# Patient Record
Sex: MALE | NOT HISPANIC OR LATINO | URBAN - METROPOLITAN AREA
[De-identification: names, ages, dates, MRNs, and addresses within clinical notes are randomized per-mention and may not be internally consistent; named-entity substitution may affect disease eponyms.]

---

## 2017-01-03 ENCOUNTER — IMPORTED ENCOUNTER (OUTPATIENT)
Dept: URBAN - METROPOLITAN AREA CLINIC 1 | Facility: CLINIC | Age: 77
End: 2017-01-03

## 2017-01-03 PROBLEM — Z96.1: Noted: 2017-01-03

## 2017-01-03 PROCEDURE — 99024 POSTOP FOLLOW-UP VISIT: CPT

## 2017-01-03 NOTE — PATIENT DISCUSSION
POM#1 CE/IOL (standard) OS doing well. Continue Lotemax/Durezol/Prednisolone BID until gone. Continue Prolensa/Ilevro/Acular QD until gone. Return for an appointment for a 30 in October with Dr. Romy Singh.

## 2017-04-20 NOTE — PATIENT DISCUSSION
Amblyopia OS: ALL LOOKS GOOD AT THIS POINT VERY MINIMAL. GAVE RX FOR NEW GLASSES.  WILL FOLLOW YEARLY

## 2017-04-20 NOTE — PATIENT DISCUSSION
DRY EYES : Discussed with patient the importance of keeping the eye moist and the symptoms associated with dry eyes including blurry vision, tearing, burning, and radha sensation. Advised patient to minimize use of any fans blowing directly on the face. Advised patient to continue with artificial tears 2-3 times daily.

## 2017-06-07 ENCOUNTER — IMPORTED ENCOUNTER (OUTPATIENT)
Dept: URBAN - METROPOLITAN AREA CLINIC 1 | Facility: CLINIC | Age: 77
End: 2017-06-07

## 2017-06-07 PROBLEM — Z96.1: Noted: 2017-06-07

## 2017-06-07 PROBLEM — Z79.84: Noted: 2017-06-07

## 2017-06-07 PROBLEM — H43.811: Noted: 2017-06-07

## 2017-06-07 PROBLEM — E11.9: Noted: 2017-06-07

## 2017-06-07 PROCEDURE — 92012 INTRM OPH EXAM EST PATIENT: CPT

## 2017-06-07 NOTE — PATIENT DISCUSSION
1.  DM Type II (Oral Medication) without sign of diabetic retinopathy and no blot heme on dilated retinal examination today OU No Macular Edema: Patient reports not having checked BS lately. VA stable since previous visit. Discussed the pathophysiology of diabetes and its effect on the eye and risk of blindness. Stressed the importance of strong glucose control. Advised of importance of at least yearly dilated examinations but to contact us immediately for any problems or concerns. 2. Pseudophakia OU - (Restor OD Standard OS)  3. PVD w/o Tear OD - RD precautions. 4.  H/o Previous Retinal Tear OD H/o Laser Retinopexy OD by Dr. Radha Cervantes. Return for an appointment for Return as scheduled with Dr. Seth Jackson.

## 2019-08-13 ENCOUNTER — IMPORTED ENCOUNTER (OUTPATIENT)
Dept: URBAN - METROPOLITAN AREA CLINIC 1 | Facility: CLINIC | Age: 79
End: 2019-08-13

## 2019-08-13 PROBLEM — H26.492: Noted: 2019-08-13

## 2019-08-13 PROBLEM — E11.9: Noted: 2019-08-13

## 2019-08-13 PROBLEM — Z79.84: Noted: 2019-08-13

## 2019-08-13 PROBLEM — H43.811: Noted: 2019-08-13

## 2019-08-13 PROBLEM — Z96.1: Noted: 2019-08-13

## 2019-08-13 PROCEDURE — 92015 DETERMINE REFRACTIVE STATE: CPT

## 2019-08-13 PROCEDURE — 92014 COMPRE OPH EXAM EST PT 1/>: CPT

## 2019-08-13 NOTE — PATIENT DISCUSSION
PCO OS- Visually Significant secondary to glare; Schedule YAG Cap OS only. Pros cons risks and benefits.

## 2019-08-13 NOTE — PATIENT DISCUSSION
1.  DM Type II (Oral Meds) without sign of diabetic retinopathy and no blot heme on dilated retinal examination today OU No Macular Edema:  Discussed the pathophysiology of diabetes and its effect on the eye and risk of blindness. Stressed the importance of strong glucose control. Advised of importance of at least yearly dilated examinations but to contact us immediately for any problems or concerns. 2. PCO OS- Visually Significant secondary to glare; Schedule YAG Cap OS only. Pros cons risks and benefits. 3.  PVD w/o Tear OD - RD precautions. 4.  Pseudophakia OU - (Restor OD *Not PMG OD* Standard OS *PMG*)  5. H/o Previous Retinal Tear OD H/o Laser Retinopexy OD by Dr. Abbey Hawthorne. Ok to use 1.25 OTC readers for near/reading work Letter to PCP Schedule YAG Cap OS

## 2019-09-20 ENCOUNTER — IMPORTED ENCOUNTER (OUTPATIENT)
Dept: URBAN - METROPOLITAN AREA CLINIC 1 | Facility: CLINIC | Age: 79
End: 2019-09-20

## 2019-09-20 PROBLEM — H26.492: Noted: 2019-09-20

## 2019-09-20 PROCEDURE — 66821 AFTER CATARACT LASER SURGERY: CPT

## 2019-09-20 NOTE — PATIENT DISCUSSION
YAG CAP OS: (Consent signed and scanned into attachments) 1 gtt Prolensa applied. The purpose and nature of the procedure possible alternative methods of treatment the risks involved and the possibility of complications were discussed with patient. The Patient wishes to proceed and the consent was signed. The laser was then performed under topical anesthesia with no complications. Post op instructions were given to patient as well as a follow-up appointment. Patient was advised to call our office if any questions or concerns. Return for an appointment in 1-4 weeks po/yag with Dr. Mani Moon.

## 2019-10-25 ENCOUNTER — IMPORTED ENCOUNTER (OUTPATIENT)
Dept: URBAN - METROPOLITAN AREA CLINIC 1 | Facility: CLINIC | Age: 79
End: 2019-10-25

## 2019-10-25 PROBLEM — Z09: Noted: 2019-10-25

## 2019-10-25 PROCEDURE — 99024 POSTOP FOLLOW-UP VISIT: CPT

## 2019-10-25 NOTE — PATIENT DISCUSSION
PO YAG Cap OS: good result. MRX given. Return for an appointment in August 30 with Dr. Dashawn Arvizu.

## 2021-06-10 ENCOUNTER — IMPORTED ENCOUNTER (OUTPATIENT)
Dept: URBAN - METROPOLITAN AREA CLINIC 1 | Facility: CLINIC | Age: 81
End: 2021-06-10

## 2021-06-10 PROBLEM — H16.143: Noted: 2021-06-10

## 2021-06-10 PROBLEM — H04.123: Noted: 2021-06-10

## 2021-06-10 PROBLEM — Z96.1: Noted: 2021-06-10

## 2021-06-10 PROBLEM — Z79.84: Noted: 2021-06-10

## 2021-06-10 PROBLEM — E11.9: Noted: 2021-06-10

## 2021-06-10 PROBLEM — H43.811: Noted: 2021-06-10

## 2021-06-10 PROBLEM — H35.361: Noted: 2021-06-10

## 2021-06-10 PROCEDURE — 99214 OFFICE O/P EST MOD 30 MIN: CPT

## 2021-06-10 PROCEDURE — 92015 DETERMINE REFRACTIVE STATE: CPT

## 2022-04-02 ASSESSMENT — VISUAL ACUITY
OS_CC: 20/40
OS_GLARE: 20/100
OD_CC: 20/30-2
OS_CC: 20/40
OD_GLARE: 20/60
OD_CC: 20/30-2
OS_CC: 20/40-1
OS_CC: 20/30
OD_CC: 20/30-1
OD_CC: 20/40
OS_CC: 20/30-2
OS_GLARE: 20/40

## 2022-04-02 ASSESSMENT — TONOMETRY
OD_IOP_MMHG: 11
OS_IOP_MMHG: 13
OD_IOP_MMHG: 12
OS_IOP_MMHG: 14
OS_IOP_MMHG: 11
OS_IOP_MMHG: 11
OD_IOP_MMHG: 12
OS_IOP_MMHG: 13
OD_IOP_MMHG: 12

## 2022-10-17 ENCOUNTER — EMERGENCY VISIT (OUTPATIENT)
Dept: URBAN - METROPOLITAN AREA CLINIC 1 | Facility: CLINIC | Age: 82
End: 2022-10-17

## 2022-10-17 DIAGNOSIS — H43.811: ICD-10-CM

## 2022-10-17 PROCEDURE — 99213 OFFICE O/P EST LOW 20 MIN: CPT

## 2022-10-17 ASSESSMENT — VISUAL ACUITY
OS_SC: 20/40-1
OD_SC: 20/40

## 2022-10-17 ASSESSMENT — TONOMETRY
OS_IOP_MMHG: 13
OD_IOP_MMHG: 13

## 2023-01-23 ENCOUNTER — COMPREHENSIVE EXAM (OUTPATIENT)
Dept: URBAN - METROPOLITAN AREA CLINIC 1 | Facility: CLINIC | Age: 83
End: 2023-01-23

## 2023-01-23 DIAGNOSIS — H04.123: ICD-10-CM

## 2023-01-23 DIAGNOSIS — E11.9: ICD-10-CM

## 2023-01-23 DIAGNOSIS — H43.811: ICD-10-CM

## 2023-01-23 DIAGNOSIS — H47.293: ICD-10-CM

## 2023-01-23 DIAGNOSIS — H35.361: ICD-10-CM

## 2023-01-23 DIAGNOSIS — H16.143: ICD-10-CM

## 2023-01-23 DIAGNOSIS — Z96.1: ICD-10-CM

## 2023-01-23 PROCEDURE — 99214 OFFICE O/P EST MOD 30 MIN: CPT

## 2023-01-23 ASSESSMENT — TONOMETRY
OD_IOP_MMHG: 13
OS_IOP_MMHG: 13

## 2023-01-23 ASSESSMENT — VISUAL ACUITY
OD_SC: 20/50
OS_SC: 20/50

## 2024-03-16 ENCOUNTER — HOSPITAL ENCOUNTER (INPATIENT)
Facility: HOSPITAL | Age: 84
LOS: 1 days | DRG: 871 | End: 2024-03-16
Attending: EMERGENCY MEDICINE | Admitting: INTERNAL MEDICINE
Payer: MEDICARE

## 2024-03-16 ENCOUNTER — APPOINTMENT (OUTPATIENT)
Facility: HOSPITAL | Age: 84
DRG: 871 | End: 2024-03-16
Payer: MEDICARE

## 2024-03-16 ENCOUNTER — HOSPITAL ENCOUNTER (EMERGENCY)
Facility: HOSPITAL | Age: 84
Discharge: HOME | DRG: 871 | End: 2024-03-19
Payer: MEDICARE

## 2024-03-16 VITALS
OXYGEN SATURATION: 75 % | SYSTOLIC BLOOD PRESSURE: 69 MMHG | BODY MASS INDEX: 38.44 KG/M2 | WEIGHT: 268.52 LBS | DIASTOLIC BLOOD PRESSURE: 48 MMHG | HEIGHT: 70 IN | RESPIRATION RATE: 25 BRPM | TEMPERATURE: 90.3 F

## 2024-03-16 DIAGNOSIS — I46.9 PEA (PULSELESS ELECTRICAL ACTIVITY) (HCC): ICD-10-CM

## 2024-03-16 DIAGNOSIS — R57.9 SHOCK (HCC): ICD-10-CM

## 2024-03-16 DIAGNOSIS — I46.9 CARDIAC ARREST (HCC): ICD-10-CM

## 2024-03-16 DIAGNOSIS — I49.01 VENTRICULAR FIBRILLATION (HCC): Primary | ICD-10-CM

## 2024-03-16 DIAGNOSIS — E87.20 LACTIC ACIDOSIS: ICD-10-CM

## 2024-03-16 DIAGNOSIS — N17.9 ACUTE KIDNEY INJURY (HCC): ICD-10-CM

## 2024-03-16 DIAGNOSIS — J96.01 ACUTE RESPIRATORY FAILURE WITH HYPOXIA (HCC): ICD-10-CM

## 2024-03-16 DIAGNOSIS — R57.0 CARDIOGENIC SHOCK (HCC): ICD-10-CM

## 2024-03-16 LAB
ALBUMIN SERPL-MCNC: 2.1 G/DL (ref 3.4–5)
ANION GAP BLD CALC-SCNC: ABNORMAL MMOL/L (ref 10–20)
ANION GAP SERPL CALC-SCNC: 19 MMOL/L (ref 3–18)
ANION GAP SERPL CALC-SCNC: 20 MMOL/L (ref 3–18)
APPEARANCE UR: CLEAR
ARTERIAL PATENCY WRIST A: ABNORMAL
BASE DEFICIT BLD-SCNC: 15.6 MMOL/L
BASE DEFICIT BLD-SCNC: 18 MMOL/L
BASE DEFICIT BLD-SCNC: 18.7 MMOL/L
BASE DEFICIT BLD-SCNC: 20 MMOL/L
BASOPHILS # BLD: 0 K/UL (ref 0–0.1)
BASOPHILS # BLD: 0.2 K/UL (ref 0–0.1)
BASOPHILS NFR BLD: 0 % (ref 0–2)
BASOPHILS NFR BLD: 1 % (ref 0–2)
BDY SITE: ABNORMAL
BILIRUB UR QL: NEGATIVE
BUN SERPL-MCNC: 18 MG/DL (ref 7–18)
BUN SERPL-MCNC: 25 MG/DL (ref 7–18)
BUN/CREAT SERPL: 13 (ref 12–20)
BUN/CREAT SERPL: 14 (ref 12–20)
CA-I BLD-MCNC: 1.14 MMOL/L (ref 1.12–1.32)
CA-I BLD-MCNC: 1.18 MMOL/L (ref 1.12–1.32)
CA-I BLD-MCNC: 1.27 MMOL/L (ref 1.12–1.32)
CALCIUM SERPL-MCNC: 7.5 MG/DL (ref 8.5–10.1)
CALCIUM SERPL-MCNC: 9.1 MG/DL (ref 8.5–10.1)
CHLORIDE BLD-SCNC: 103 MMOL/L (ref 98–107)
CHLORIDE BLD-SCNC: 103 MMOL/L (ref 98–107)
CHLORIDE BLD-SCNC: 112 MMOL/L (ref 98–107)
CHLORIDE SERPL-SCNC: 102 MMOL/L (ref 100–111)
CHLORIDE SERPL-SCNC: 112 MMOL/L (ref 100–111)
CO2 BLD-SCNC: 10 MMOL/L (ref 19–24)
CO2 BLD-SCNC: 12 MMOL/L (ref 19–24)
CO2 BLD-SCNC: 20 MMOL/L (ref 19–24)
CO2 SERPL-SCNC: 13 MMOL/L (ref 21–32)
CO2 SERPL-SCNC: 19 MMOL/L (ref 21–32)
COLOR UR: YELLOW
CREAT BLD-MCNC: 1.13 MG/DL (ref 0.6–1.3)
CREAT BLD-MCNC: 1.57 MG/DL (ref 0.6–1.3)
CREAT BLD-MCNC: 1.73 MG/DL (ref 0.6–1.3)
CREAT SERPL-MCNC: 1.34 MG/DL (ref 0.6–1.3)
CREAT SERPL-MCNC: 1.81 MG/DL (ref 0.6–1.3)
DIFFERENTIAL METHOD BLD: ABNORMAL
DIFFERENTIAL METHOD BLD: ABNORMAL
EKG DIAGNOSIS: NORMAL
EKG DIAGNOSIS: NORMAL
EKG Q-T INTERVAL: 354 MS
EKG Q-T INTERVAL: 438 MS
EKG QRS DURATION: 80 MS
EKG QRS DURATION: 82 MS
EKG QTC CALCULATION (BAZETT): 344 MS
EKG QTC CALCULATION (BAZETT): 532 MS
EKG R AXIS: 60 DEGREES
EKG R AXIS: 63 DEGREES
EKG T AXIS: -85 DEGREES
EKG T AXIS: 130 DEGREES
EKG VENTRICULAR RATE: 57 BPM
EKG VENTRICULAR RATE: 89 BPM
EOSINOPHIL # BLD: 0 K/UL (ref 0–0.4)
EOSINOPHIL # BLD: 0 K/UL (ref 0–0.4)
EOSINOPHIL NFR BLD: 0 % (ref 0–5)
EOSINOPHIL NFR BLD: 0 % (ref 0–5)
ERYTHROCYTE [DISTWIDTH] IN BLOOD BY AUTOMATED COUNT: 18 % (ref 11.6–14.5)
ERYTHROCYTE [DISTWIDTH] IN BLOOD BY AUTOMATED COUNT: 18.6 % (ref 11.6–14.5)
EST. AVERAGE GLUCOSE BLD GHB EST-MCNC: 108 MG/DL
FIO2 ON VENT: 100 %
FIO2 ON VENT: 100 %
GAS FLOW.O2 O2 DELIVERY SYS: ABNORMAL
GAS FLOW.O2 SETTING OXYMISER: 30 BPM
GLUCOSE BLD STRIP.AUTO-MCNC: 250 MG/DL (ref 70–110)
GLUCOSE BLD STRIP.AUTO-MCNC: 259 MG/DL (ref 70–110)
GLUCOSE BLD STRIP.AUTO-MCNC: 294 MG/DL (ref 70–110)
GLUCOSE BLD STRIP.AUTO-MCNC: 360 MG/DL (ref 70–110)
GLUCOSE BLD STRIP.AUTO-MCNC: 383 MG/DL (ref 70–110)
GLUCOSE BLD-MCNC: 263 MG/DL (ref 65–100)
GLUCOSE BLD-MCNC: 338 MG/DL (ref 65–100)
GLUCOSE BLD-MCNC: 467 MG/DL (ref 65–100)
GLUCOSE SERPL-MCNC: 327 MG/DL (ref 74–99)
GLUCOSE SERPL-MCNC: 356 MG/DL (ref 74–99)
GLUCOSE UR STRIP.AUTO-MCNC: NEGATIVE MG/DL
HBA1C MFR BLD: 5.4 % (ref 4.2–5.6)
HCO3 BLD-SCNC: 11.1 MMOL/L (ref 22–26)
HCO3 BLD-SCNC: 12.9 MMOL/L (ref 22–26)
HCO3 BLD-SCNC: 18 MMOL/L (ref 22–26)
HCO3 BLD-SCNC: 9.4 MMOL/L (ref 22–26)
HCT VFR BLD AUTO: 28.9 % (ref 36–48)
HCT VFR BLD AUTO: 37.2 % (ref 36–48)
HGB BLD-MCNC: 10.6 G/DL (ref 13–16)
HGB BLD-MCNC: 8.4 G/DL (ref 13–16)
HGB UR QL STRIP: NEGATIVE
IMM GRANULOCYTES # BLD AUTO: 0 K/UL
IMM GRANULOCYTES # BLD AUTO: 0 K/UL (ref 0–0.04)
IMM GRANULOCYTES NFR BLD AUTO: 0 %
IMM GRANULOCYTES NFR BLD AUTO: 0 % (ref 0–0.5)
INSPIRATION.DURATION SETTING TIME VENT: 0.7 SEC
INSPIRATION.DURATION SETTING TIME VENT: 1 SEC
IPAP/PIP/HIGH PEEP: 27
KETONES UR QL STRIP.AUTO: NEGATIVE MG/DL
LACTATE BLD-SCNC: 13.07 MMOL/L (ref 0.4–2)
LACTATE BLD-SCNC: 13.36 MMOL/L (ref 0.4–2)
LACTATE BLD-SCNC: 13.61 MMOL/L (ref 0.4–2)
LACTATE BLD-SCNC: 16.34 MMOL/L (ref 0.4–2)
LACTATE SERPL-SCNC: NORMAL MMOL/L (ref 0.4–2)
LEUKOCYTE ESTERASE UR QL STRIP.AUTO: NEGATIVE
LYMPHOCYTES # BLD: 4.7 K/UL (ref 0.9–3.6)
LYMPHOCYTES # BLD: 6.4 K/UL (ref 0.9–3.6)
LYMPHOCYTES NFR BLD: 26 % (ref 21–52)
LYMPHOCYTES NFR BLD: 47 % (ref 21–52)
MAGNESIUM SERPL-MCNC: 2.9 MG/DL (ref 1.6–2.6)
MCH RBC QN AUTO: 20.1 PG (ref 24–34)
MCH RBC QN AUTO: 20.2 PG (ref 24–34)
MCHC RBC AUTO-ENTMCNC: 28.5 G/DL (ref 31–37)
MCHC RBC AUTO-ENTMCNC: 29.1 G/DL (ref 31–37)
MCV RBC AUTO: 69.5 FL (ref 78–100)
MCV RBC AUTO: 70.5 FL (ref 78–100)
MONOCYTES # BLD: 0 K/UL (ref 0.05–1.2)
MONOCYTES # BLD: 0.4 K/UL (ref 0.05–1.2)
MONOCYTES NFR BLD: 0 % (ref 3–10)
MONOCYTES NFR BLD: 3 % (ref 3–10)
NEUTS BAND NFR BLD MANUAL: 8 % (ref 0–5)
NEUTS SEG # BLD: 13.1 K/UL (ref 1.8–8)
NEUTS SEG # BLD: 6.8 K/UL (ref 1.8–8)
NEUTS SEG NFR BLD: 50 % (ref 40–73)
NEUTS SEG NFR BLD: 65 % (ref 40–73)
NITRITE UR QL STRIP.AUTO: NEGATIVE
NRBC # BLD: 0.22 K/UL (ref 0–0.01)
NRBC # BLD: 0.27 K/UL (ref 0–0.01)
NRBC BLD-RTO: 1.5 PER 100 WBC
NRBC BLD-RTO: 1.6 PER 100 WBC
NT PRO BNP: 625 PG/ML (ref 0–1800)
O2/TOTAL GAS SETTING VFR VENT: 100 %
PAW @ MEAN EXP FLOW ON VENT: 18 CMH2O
PCO2 BLD: 34.9 MMHG (ref 35–45)
PCO2 BLD: 39.3 MMHG (ref 35–45)
PCO2 BLD: 41.1 MMHG (ref 35–45)
PCO2 BLDV: 106.2 MMHG (ref 41–51)
PEEP RESPIRATORY: 8
PEEP RESPIRATORY: 8
PEEP RESPIRATORY: 8 CMH2O
PH BLD: 7.04 (ref 7.35–7.45)
PH BLD: 7.04 (ref 7.35–7.45)
PH BLD: 7.13 (ref 7.35–7.45)
PH BLDV: 6.84 (ref 7.32–7.42)
PH UR STRIP: 6.5 (ref 5–8)
PHOSPHATE SERPL-MCNC: 4.4 MG/DL (ref 2.5–4.9)
PLATELET # BLD AUTO: 100 K/UL (ref 135–420)
PLATELET # BLD AUTO: 146 K/UL (ref 135–420)
PLATELET COMMENT: ABNORMAL
PLATELET COMMENT: ABNORMAL
PO2 BLD: 136 MMHG (ref 80–100)
PO2 BLD: 166 MMHG (ref 80–100)
PO2 BLD: 77 MMHG (ref 80–100)
PO2 BLDV: 39 MMHG (ref 25–40)
POTASSIUM BLD-SCNC: 2.3 MMOL/L (ref 3.5–5.1)
POTASSIUM BLD-SCNC: 2.5 MMOL/L (ref 3.5–5.1)
POTASSIUM BLD-SCNC: 3.8 MMOL/L (ref 3.5–5.1)
POTASSIUM SERPL-SCNC: 2.7 MMOL/L (ref 3.5–5.5)
POTASSIUM SERPL-SCNC: 4.2 MMOL/L (ref 3.5–5.5)
PROT UR STRIP-MCNC: NEGATIVE MG/DL
RBC # BLD AUTO: 4.16 M/UL (ref 4.35–5.65)
RBC # BLD AUTO: 5.28 M/UL (ref 4.35–5.65)
RBC MORPH BLD: ABNORMAL
RESPIRATORY RATE, POC: 23 (ref 5–40)
RESPIRATORY RATE, POC: 23 (ref 5–40)
RESPIRATORY RATE, POC: 30 (ref 5–40)
SAO2 % BLD: 36 %
SAO2 % BLD: 88 %
SAO2 % BLD: 97 %
SAO2 % BLD: 98.9 % (ref 92–97)
SERVICE CMNT-IMP: ABNORMAL
SODIUM BLD-SCNC: 137 MMOL/L (ref 136–145)
SODIUM BLD-SCNC: 142 MMOL/L (ref 136–145)
SODIUM BLD-SCNC: 143 MMOL/L (ref 136–145)
SODIUM SERPL-SCNC: 140 MMOL/L (ref 136–145)
SODIUM SERPL-SCNC: 145 MMOL/L (ref 136–145)
SP GR UR REFRACTOMETRY: 1.01 (ref 1–1.03)
SPECIMEN SITE: ABNORMAL
SPECIMEN TYPE: ABNORMAL
TROPONIN I SERPL HS-MCNC: 81 NG/L (ref 0–78)
TROPONIN I SERPL HS-MCNC: ABNORMAL NG/L (ref 0–78)
UROBILINOGEN UR QL STRIP.AUTO: 0.2 EU/DL (ref 0.2–1)
VENTILATION MODE VENT: ABNORMAL
VT SETTING VENT: 550
VT SETTING VENT: 550
VT SETTING VENT: 550 ML
WBC # BLD AUTO: 13.6 K/UL (ref 4.6–13.2)
WBC # BLD AUTO: 18 K/UL (ref 4.6–13.2)
WBC MORPH BLD: ABNORMAL

## 2024-03-16 PROCEDURE — A4216 STERILE WATER/SALINE, 10 ML: HCPCS | Performed by: PHYSICIAN ASSISTANT

## 2024-03-16 PROCEDURE — 6360000002 HC RX W HCPCS: Performed by: EMERGENCY MEDICINE

## 2024-03-16 PROCEDURE — 2700000000 HC OXYGEN THERAPY PER DAY

## 2024-03-16 PROCEDURE — 82330 ASSAY OF CALCIUM: CPT

## 2024-03-16 PROCEDURE — 6370000000 HC RX 637 (ALT 250 FOR IP): Performed by: PHYSICIAN ASSISTANT

## 2024-03-16 PROCEDURE — 71275 CT ANGIOGRAPHY CHEST: CPT

## 2024-03-16 PROCEDURE — 87205 SMEAR GRAM STAIN: CPT

## 2024-03-16 PROCEDURE — 96365 THER/PROPH/DIAG IV INF INIT: CPT

## 2024-03-16 PROCEDURE — 93005 ELECTROCARDIOGRAM TRACING: CPT | Performed by: EMERGENCY MEDICINE

## 2024-03-16 PROCEDURE — 94761 N-INVAS EAR/PLS OXIMETRY MLT: CPT

## 2024-03-16 PROCEDURE — 2500000003 HC RX 250 WO HCPCS: Performed by: PHYSICIAN ASSISTANT

## 2024-03-16 PROCEDURE — P9047 ALBUMIN (HUMAN), 25%, 50ML: HCPCS | Performed by: EMERGENCY MEDICINE

## 2024-03-16 PROCEDURE — 99292 CRITICAL CARE ADDL 30 MIN: CPT | Performed by: INTERNAL MEDICINE

## 2024-03-16 PROCEDURE — 84295 ASSAY OF SERUM SODIUM: CPT

## 2024-03-16 PROCEDURE — 83735 ASSAY OF MAGNESIUM: CPT

## 2024-03-16 PROCEDURE — 84484 ASSAY OF TROPONIN QUANT: CPT

## 2024-03-16 PROCEDURE — 5A1935Z RESPIRATORY VENTILATION, LESS THAN 24 CONSECUTIVE HOURS: ICD-10-PCS | Performed by: INTERNAL MEDICINE

## 2024-03-16 PROCEDURE — 74177 CT ABD & PELVIS W/CONTRAST: CPT

## 2024-03-16 PROCEDURE — 2580000003 HC RX 258: Performed by: EMERGENCY MEDICINE

## 2024-03-16 PROCEDURE — 83880 ASSAY OF NATRIURETIC PEPTIDE: CPT

## 2024-03-16 PROCEDURE — 6360000002 HC RX W HCPCS: Performed by: PHYSICIAN ASSISTANT

## 2024-03-16 PROCEDURE — 82803 BLOOD GASES ANY COMBINATION: CPT

## 2024-03-16 PROCEDURE — 93010 ELECTROCARDIOGRAM REPORT: CPT | Performed by: INTERNAL MEDICINE

## 2024-03-16 PROCEDURE — 36556 INSERT NON-TUNNEL CV CATH: CPT

## 2024-03-16 PROCEDURE — 6360000002 HC RX W HCPCS: Performed by: INTERNAL MEDICINE

## 2024-03-16 PROCEDURE — 82962 GLUCOSE BLOOD TEST: CPT

## 2024-03-16 PROCEDURE — 6360000002 HC RX W HCPCS: Performed by: STUDENT IN AN ORGANIZED HEALTH CARE EDUCATION/TRAINING PROGRAM

## 2024-03-16 PROCEDURE — 37799 UNLISTED PX VASCULAR SURGERY: CPT

## 2024-03-16 PROCEDURE — 80048 BASIC METABOLIC PNL TOTAL CA: CPT

## 2024-03-16 PROCEDURE — 2000000000 HC ICU R&B

## 2024-03-16 PROCEDURE — 36620 INSERTION CATHETER ARTERY: CPT | Performed by: INTERNAL MEDICINE

## 2024-03-16 PROCEDURE — 2500000003 HC RX 250 WO HCPCS: Performed by: EMERGENCY MEDICINE

## 2024-03-16 PROCEDURE — 99239 HOSP IP/OBS DSCHRG MGMT >30: CPT | Performed by: NURSE PRACTITIONER

## 2024-03-16 PROCEDURE — 76937 US GUIDE VASCULAR ACCESS: CPT | Performed by: INTERNAL MEDICINE

## 2024-03-16 PROCEDURE — 96368 THER/DIAG CONCURRENT INF: CPT

## 2024-03-16 PROCEDURE — 87086 URINE CULTURE/COLONY COUNT: CPT

## 2024-03-16 PROCEDURE — 4A133J1 MONITORING OF ARTERIAL PULSE, PERIPHERAL, PERCUTANEOUS APPROACH: ICD-10-PCS | Performed by: INTERNAL MEDICINE

## 2024-03-16 PROCEDURE — 6360000002 HC RX W HCPCS: Performed by: NURSE PRACTITIONER

## 2024-03-16 PROCEDURE — 85025 COMPLETE CBC W/AUTO DIFF WBC: CPT

## 2024-03-16 PROCEDURE — 80069 RENAL FUNCTION PANEL: CPT

## 2024-03-16 PROCEDURE — 99285 EMERGENCY DEPT VISIT HI MDM: CPT

## 2024-03-16 PROCEDURE — 92950 HEART/LUNG RESUSCITATION CPR: CPT | Performed by: INTERNAL MEDICINE

## 2024-03-16 PROCEDURE — 81003 URINALYSIS AUTO W/O SCOPE: CPT

## 2024-03-16 PROCEDURE — 92950 HEART/LUNG RESUSCITATION CPR: CPT

## 2024-03-16 PROCEDURE — 6370000000 HC RX 637 (ALT 250 FOR IP): Performed by: INTERNAL MEDICINE

## 2024-03-16 PROCEDURE — 6360000004 HC RX CONTRAST MEDICATION: Performed by: EMERGENCY MEDICINE

## 2024-03-16 PROCEDURE — 96375 TX/PRO/DX INJ NEW DRUG ADDON: CPT

## 2024-03-16 PROCEDURE — 94762 N-INVAS EAR/PLS OXIMTRY CONT: CPT

## 2024-03-16 PROCEDURE — 2580000003 HC RX 258: Performed by: INTERNAL MEDICINE

## 2024-03-16 PROCEDURE — 36600 WITHDRAWAL OF ARTERIAL BLOOD: CPT

## 2024-03-16 PROCEDURE — 84132 ASSAY OF SERUM POTASSIUM: CPT

## 2024-03-16 PROCEDURE — 87070 CULTURE OTHR SPECIMN AEROBIC: CPT

## 2024-03-16 PROCEDURE — 99291 CRITICAL CARE FIRST HOUR: CPT | Performed by: INTERNAL MEDICINE

## 2024-03-16 PROCEDURE — 31500 INSERT EMERGENCY AIRWAY: CPT

## 2024-03-16 PROCEDURE — 85014 HEMATOCRIT: CPT

## 2024-03-16 PROCEDURE — 2580000003 HC RX 258: Performed by: PHYSICIAN ASSISTANT

## 2024-03-16 PROCEDURE — 94002 VENT MGMT INPAT INIT DAY: CPT

## 2024-03-16 PROCEDURE — 82947 ASSAY GLUCOSE BLOOD QUANT: CPT

## 2024-03-16 PROCEDURE — 03HY32Z INSERTION OF MONITORING DEVICE INTO UPPER ARTERY, PERCUTANEOUS APPROACH: ICD-10-PCS | Performed by: INTERNAL MEDICINE

## 2024-03-16 PROCEDURE — 83605 ASSAY OF LACTIC ACID: CPT

## 2024-03-16 PROCEDURE — 83036 HEMOGLOBIN GLYCOSYLATED A1C: CPT

## 2024-03-16 PROCEDURE — 87040 BLOOD CULTURE FOR BACTERIA: CPT

## 2024-03-16 PROCEDURE — 3E033XZ INTRODUCTION OF VASOPRESSOR INTO PERIPHERAL VEIN, PERCUTANEOUS APPROACH: ICD-10-PCS | Performed by: INTERNAL MEDICINE

## 2024-03-16 PROCEDURE — 4A133B1 MONITORING OF ARTERIAL PRESSURE, PERIPHERAL, PERCUTANEOUS APPROACH: ICD-10-PCS | Performed by: INTERNAL MEDICINE

## 2024-03-16 PROCEDURE — 36415 COLL VENOUS BLD VENIPUNCTURE: CPT

## 2024-03-16 PROCEDURE — 71045 X-RAY EXAM CHEST 1 VIEW: CPT

## 2024-03-16 PROCEDURE — 70450 CT HEAD/BRAIN W/O DYE: CPT

## 2024-03-16 PROCEDURE — 96366 THER/PROPH/DIAG IV INF ADDON: CPT

## 2024-03-16 PROCEDURE — 2580000003 HC RX 258: Performed by: STUDENT IN AN ORGANIZED HEALTH CARE EDUCATION/TRAINING PROGRAM

## 2024-03-16 RX ORDER — MAGNESIUM SULFATE IN WATER 40 MG/ML
2000 INJECTION, SOLUTION INTRAVENOUS ONCE
Status: COMPLETED | OUTPATIENT
Start: 2024-03-16 | End: 2024-03-16

## 2024-03-16 RX ORDER — INSULIN LISPRO 100 [IU]/ML
0-16 INJECTION, SOLUTION INTRAVENOUS; SUBCUTANEOUS EVERY 6 HOURS
Status: DISCONTINUED | OUTPATIENT
Start: 2024-03-16 | End: 2024-03-16

## 2024-03-16 RX ORDER — EPINEPHRINE 1 MG/ML(1)
0.5 AMPUL (ML) INJECTION
Status: COMPLETED | OUTPATIENT
Start: 2024-03-16 | End: 2024-03-16

## 2024-03-16 RX ORDER — 0.9 % SODIUM CHLORIDE 0.9 %
1000 INTRAVENOUS SOLUTION INTRAVENOUS ONCE
Status: COMPLETED | OUTPATIENT
Start: 2024-03-16 | End: 2024-03-16

## 2024-03-16 RX ORDER — ALBUMIN (HUMAN) 12.5 G/50ML
25 SOLUTION INTRAVENOUS ONCE
Status: COMPLETED | OUTPATIENT
Start: 2024-03-16 | End: 2024-03-16

## 2024-03-16 RX ORDER — GLYCOPYRROLATE 0.2 MG/ML
0.2 INJECTION INTRAMUSCULAR; INTRAVENOUS EVERY 4 HOURS PRN
Status: DISCONTINUED | OUTPATIENT
Start: 2024-03-16 | End: 2024-03-17 | Stop reason: HOSPADM

## 2024-03-16 RX ORDER — SODIUM CHLORIDE, SODIUM LACTATE, POTASSIUM CHLORIDE, CALCIUM CHLORIDE 600; 310; 30; 20 MG/100ML; MG/100ML; MG/100ML; MG/100ML
INJECTION, SOLUTION INTRAVENOUS CONTINUOUS
Status: DISCONTINUED | OUTPATIENT
Start: 2024-03-16 | End: 2024-03-16

## 2024-03-16 RX ORDER — ONDANSETRON 2 MG/ML
4 INJECTION INTRAMUSCULAR; INTRAVENOUS EVERY 6 HOURS PRN
Status: DISCONTINUED | OUTPATIENT
Start: 2024-03-16 | End: 2024-03-17 | Stop reason: HOSPADM

## 2024-03-16 RX ORDER — EPINEPHRINE IN SOD CHLOR,ISO 1 MG/10 ML
0.5 SYRINGE (ML) INTRAVENOUS
Status: COMPLETED | OUTPATIENT
Start: 2024-03-16 | End: 2024-03-16

## 2024-03-16 RX ORDER — MAGNESIUM SULFATE 1 G/100ML
1000 INJECTION INTRAVENOUS ONCE
Status: DISCONTINUED | OUTPATIENT
Start: 2024-03-16 | End: 2024-03-16

## 2024-03-16 RX ORDER — IPRATROPIUM BROMIDE AND ALBUTEROL SULFATE 2.5; .5 MG/3ML; MG/3ML
1 SOLUTION RESPIRATORY (INHALATION)
Status: DISCONTINUED | OUTPATIENT
Start: 2024-03-16 | End: 2024-03-16

## 2024-03-16 RX ORDER — IPRATROPIUM BROMIDE AND ALBUTEROL SULFATE 2.5; .5 MG/3ML; MG/3ML
1 SOLUTION RESPIRATORY (INHALATION) EVERY 4 HOURS PRN
Status: DISCONTINUED | OUTPATIENT
Start: 2024-03-16 | End: 2024-03-16

## 2024-03-16 RX ORDER — EPINEPHRINE 1 MG/ML(1)
1 AMPUL (ML) INJECTION
Status: DISCONTINUED | OUTPATIENT
Start: 2024-03-16 | End: 2024-03-16

## 2024-03-16 RX ORDER — DEXTROSE MONOHYDRATE 100 MG/ML
INJECTION, SOLUTION INTRAVENOUS CONTINUOUS PRN
Status: DISCONTINUED | OUTPATIENT
Start: 2024-03-16 | End: 2024-03-16

## 2024-03-16 RX ORDER — NOREPINEPHRINE BITARTRATE 0.06 MG/ML
1-100 INJECTION, SOLUTION INTRAVENOUS CONTINUOUS
Status: DISCONTINUED | OUTPATIENT
Start: 2024-03-16 | End: 2024-03-16

## 2024-03-16 RX ORDER — MINERAL OIL AND WHITE PETROLATUM 150; 830 MG/G; MG/G
OINTMENT OPHTHALMIC EVERY 4 HOURS
Status: DISCONTINUED | OUTPATIENT
Start: 2024-03-16 | End: 2024-03-16 | Stop reason: RX

## 2024-03-16 RX ORDER — MORPHINE SULFATE 4 MG/ML
4 INJECTION, SOLUTION INTRAMUSCULAR; INTRAVENOUS
Status: DISCONTINUED | OUTPATIENT
Start: 2024-03-16 | End: 2024-03-17 | Stop reason: HOSPADM

## 2024-03-16 RX ORDER — CHLORHEXIDINE GLUCONATE ORAL RINSE 1.2 MG/ML
15 SOLUTION DENTAL 2 TIMES DAILY
Status: DISCONTINUED | OUTPATIENT
Start: 2024-03-16 | End: 2024-03-16

## 2024-03-16 RX ORDER — POTASSIUM CHLORIDE 29.8 MG/ML
20 INJECTION INTRAVENOUS
Status: DISCONTINUED | OUTPATIENT
Start: 2024-03-16 | End: 2024-03-16

## 2024-03-16 RX ORDER — SODIUM CHLORIDE, SODIUM LACTATE, POTASSIUM CHLORIDE, AND CALCIUM CHLORIDE .6; .31; .03; .02 G/100ML; G/100ML; G/100ML; G/100ML
1000 INJECTION, SOLUTION INTRAVENOUS ONCE
Status: COMPLETED | OUTPATIENT
Start: 2024-03-16 | End: 2024-03-16

## 2024-03-16 RX ORDER — MORPHINE SULFATE 2 MG/ML
2 INJECTION, SOLUTION INTRAMUSCULAR; INTRAVENOUS
Status: DISCONTINUED | OUTPATIENT
Start: 2024-03-16 | End: 2024-03-17 | Stop reason: HOSPADM

## 2024-03-16 RX ORDER — LORAZEPAM 2 MG/ML
1 INJECTION INTRAMUSCULAR EVERY 6 HOURS PRN
Status: DISCONTINUED | OUTPATIENT
Start: 2024-03-16 | End: 2024-03-17 | Stop reason: HOSPADM

## 2024-03-16 RX ADMIN — SODIUM CHLORIDE 1000 ML: 9 INJECTION, SOLUTION INTRAVENOUS at 06:54

## 2024-03-16 RX ADMIN — EPINEPHRINE 200 MCG/MIN: 1 INJECTION INTRAMUSCULAR; INTRAVENOUS; SUBCUTANEOUS at 13:46

## 2024-03-16 RX ADMIN — EPINEPHRINE 150 MCG/MIN: 1 INJECTION INTRAMUSCULAR; INTRAVENOUS; SUBCUTANEOUS at 12:48

## 2024-03-16 RX ADMIN — AMIODARONE HYDROCHLORIDE 150 MG: 1.5 INJECTION, SOLUTION INTRAVENOUS at 11:59

## 2024-03-16 RX ADMIN — AMIODARONE HYDROCHLORIDE 1 MG/MIN: 1.8 INJECTION, SOLUTION INTRAVENOUS at 08:47

## 2024-03-16 RX ADMIN — INSULIN LISPRO 8 UNITS: 100 INJECTION, SOLUTION INTRAVENOUS; SUBCUTANEOUS at 19:50

## 2024-03-16 RX ADMIN — Medication 100 MCG/MIN: at 14:52

## 2024-03-16 RX ADMIN — POLYVINYL ALCOHOL, POVIDONE 1 DROP: 14; 6 SOLUTION/ DROPS OPHTHALMIC at 18:03

## 2024-03-16 RX ADMIN — EPINEPHRINE 0.5 MG: 0.1 INJECTION INTRACARDIAC; INTRAVENOUS at 08:05

## 2024-03-16 RX ADMIN — INSULIN LISPRO 16 UNITS: 100 INJECTION, SOLUTION INTRAVENOUS; SUBCUTANEOUS at 13:23

## 2024-03-16 RX ADMIN — EPINEPHRINE 200 MCG/MIN: 1 INJECTION INTRAMUSCULAR; INTRAVENOUS; SUBCUTANEOUS at 18:01

## 2024-03-16 RX ADMIN — ALBUMIN (HUMAN) 25 G: 0.25 INJECTION, SOLUTION INTRAVENOUS at 08:45

## 2024-03-16 RX ADMIN — Medication 100 MCG/MIN: at 17:49

## 2024-03-16 RX ADMIN — EPINEPHRINE 200 MCG/MIN: 1 INJECTION INTRAMUSCULAR; INTRAVENOUS; SUBCUTANEOUS at 20:00

## 2024-03-16 RX ADMIN — FAMOTIDINE 20 MG: 10 INJECTION, SOLUTION INTRAVENOUS at 13:46

## 2024-03-16 RX ADMIN — VASOPRESSIN 0.03 UNITS/MIN: 20 INJECTION INTRAVENOUS at 06:51

## 2024-03-16 RX ADMIN — Medication 100 MCG/MIN: at 06:20

## 2024-03-16 RX ADMIN — SODIUM BICARBONATE: 84 INJECTION, SOLUTION INTRAVENOUS at 07:09

## 2024-03-16 RX ADMIN — EPINEPHRINE 30 MCG/MIN: 1 INJECTION INTRAMUSCULAR; INTRAVENOUS; SUBCUTANEOUS at 08:35

## 2024-03-16 RX ADMIN — PIPERACILLIN AND TAZOBACTAM 3375 MG: 3; .375 INJECTION, POWDER, FOR SOLUTION INTRAVENOUS at 17:30

## 2024-03-16 RX ADMIN — SODIUM CHLORIDE, POTASSIUM CHLORIDE, SODIUM LACTATE AND CALCIUM CHLORIDE 1000 ML: 600; 310; 30; 20 INJECTION, SOLUTION INTRAVENOUS at 16:52

## 2024-03-16 RX ADMIN — IOPAMIDOL 85 ML: 755 INJECTION, SOLUTION INTRAVENOUS at 09:55

## 2024-03-16 RX ADMIN — Medication 100 MCG/MIN: at 12:49

## 2024-03-16 RX ADMIN — 0.12% CHLORHEXIDINE GLUCONATE 15 ML: 1.2 RINSE ORAL at 13:46

## 2024-03-16 RX ADMIN — SODIUM CHLORIDE, POTASSIUM CHLORIDE, SODIUM LACTATE AND CALCIUM CHLORIDE: 600; 310; 30; 20 INJECTION, SOLUTION INTRAVENOUS at 08:25

## 2024-03-16 RX ADMIN — MORPHINE SULFATE 4 MG: 4 INJECTION, SOLUTION INTRAMUSCULAR; INTRAVENOUS at 20:46

## 2024-03-16 RX ADMIN — EPINEPHRINE 7 MCG/MIN: 1 INJECTION INTRAMUSCULAR; INTRAVENOUS; SUBCUTANEOUS at 08:21

## 2024-03-16 RX ADMIN — EPINEPHRINE 200 MCG/MIN: 1 INJECTION INTRAMUSCULAR; INTRAVENOUS; SUBCUTANEOUS at 15:12

## 2024-03-16 RX ADMIN — EPINEPHRINE 200 MCG/MIN: 1 INJECTION INTRAMUSCULAR; INTRAVENOUS; SUBCUTANEOUS at 13:23

## 2024-03-16 RX ADMIN — POTASSIUM CHLORIDE 20 MEQ: 29.8 INJECTION, SOLUTION INTRAVENOUS at 17:37

## 2024-03-16 RX ADMIN — EPINEPHRINE 200 MCG/MIN: 1 INJECTION INTRAMUSCULAR; INTRAVENOUS; SUBCUTANEOUS at 17:04

## 2024-03-16 RX ADMIN — PIPERACILLIN AND TAZOBACTAM 4500 MG: 4; .5 INJECTION, POWDER, FOR SOLUTION INTRAVENOUS at 13:22

## 2024-03-16 RX ADMIN — EPINEPHRINE 5 MCG/MIN: 1 INJECTION INTRAMUSCULAR; INTRAVENOUS; SUBCUTANEOUS at 08:11

## 2024-03-16 RX ADMIN — POTASSIUM CHLORIDE 20 MEQ: 29.8 INJECTION, SOLUTION INTRAVENOUS at 18:37

## 2024-03-16 RX ADMIN — SODIUM BICARBONATE: 84 INJECTION, SOLUTION INTRAVENOUS at 18:49

## 2024-03-16 RX ADMIN — EPINEPHRINE 200 MCG/MIN: 1 INJECTION INTRAMUSCULAR; INTRAVENOUS; SUBCUTANEOUS at 18:57

## 2024-03-16 RX ADMIN — POLYVINYL ALCOHOL, POVIDONE 1 DROP: 14; 6 SOLUTION/ DROPS OPHTHALMIC at 13:46

## 2024-03-16 RX ADMIN — EPINEPHRINE 0.5 MG: 0.1 INJECTION, SOLUTION ENDOTRACHEAL; INTRACARDIAC; INTRAVENOUS at 08:35

## 2024-03-16 RX ADMIN — MAGNESIUM SULFATE IN WATER 2000 MG: 2 INJECTION, SOLUTION INTRAVENOUS at 07:42

## 2024-03-16 RX ADMIN — EPINEPHRINE 200 MCG/MIN: 1 INJECTION INTRAMUSCULAR; INTRAVENOUS; SUBCUTANEOUS at 13:04

## 2024-03-16 ASSESSMENT — PULMONARY FUNCTION TESTS
PIF_VALUE: 38
PIF_VALUE: 27
PIF_VALUE: 34

## 2024-03-16 NOTE — ED NOTES
This RN escorted Dr Eason to speak with the family in the Consultation room. Wife, daughter, and son-in-law present. Dr Eason explained the severity of pt condition. All questions answered pt is still Full Code status at this time per family.

## 2024-03-16 NOTE — PROGRESS NOTES
responded to \"Code Blue\" for  Duane Cardenas, who is a 84 y.o.,male,     The reason patient came to hospital is:   Patient Active Problem List    Diagnosis Date Noted    Ventricular fibrillation (HCC) 03/16/2024    Chronic coronary artery disease 02/11/2016    Bilateral hearing loss 01/27/2016    Anemia 01/26/2016    Decreased urine stream 09/02/2014    Urinary urgency 09/02/2014    Phimosis 01/11/2012    BPH (benign prostatic hyperplasia) 01/11/2012    ED (erectile dysfunction) 01/11/2012       The  provided the following Interventions:  Provided crisis pastoral care and pastoral support.   Offered prayers on behalf for the patient.   Chart reviewed.    The following outcomes were achieved:  Patient was successfully resuscitated.    Assessment:  There are no spiritual or Amish issues which require intervention at this time.     Plan:  Chaplains will continue to follow and will provide pastoral care on an as needed/requested basis.   recommends bedside caregivers page  on duty if patient shows signs of acute spiritual or emotional distress.     Provided emotional and spiritual support to thr family.      Leonardo Martinez  Staff   Spiritual Health  [phone]

## 2024-03-16 NOTE — ED PROVIDER NOTES
Prominent interstitial markings, may represent underlying chronic changes and/or   superimposed congestion.      Bilateral lung base patchiness as above.      CTA CHEST W WO CONTRAST PE Eval   Final Result   .      No obstructive pulmonary embolism.      Bilateral chronic lung changes with small amount of patchiness predominantly in   the bases, may represent superimposed atelectasis or infiltrate.      Small foci of air within the central liver as well as within the gallbladder is   above. Cholelithiasis. Trace suggested pericholecystic fluid. If there is   concern for acute cholecystitis, HIDA scan and/or ultrasound may be helpful as   warranted.      CT Head W/O Contrast   Final Result      No convincing acute intracranial abnormality.      Small amount of hypoattenuation left occipital lobe (20-21) only seen on these   images, favored to represent overlapping structures. However please correct   clinically if there is high clinical suspicion, MR is more sensitive for early   acute ischemia and is recommended.       Mastoid effusions.         CT ABDOMEN PELVIS W IV CONTRAST Additional Contrast? Radiologist Recommendation   Final Result      Bilateral lung base consolidative change, may represent atelectasis or   infiltrate.       Tiny foci of air within the liver centrally as well as gallbladder as above. No   free intraperitoneal air. Findings are nonspecific. Recommend continued clinical   and imaging follow-up.      Borderline small bowel loops as above.      Other incidental CT findings as above.               Medical Decision Making   I am the first provider for this patient.    I reviewed the vital signs, available nursing notes, past medical history, past surgical history, family history and social history.    Vital Signs-Reviewed the patient's vital signs.      EKG: ed course       ED Course: Progress Notes, Reevaluation, and Consults:    Provider Notes (Medical Decision Making):   MDM  84-year-old male

## 2024-03-16 NOTE — PROCEDURES
Intubation    Date/Time: 3/16/2024 6:54 AM    Performed by: Gabriele Myles MD  Authorized by: Gabriele Myles MD    Consent:     Consent obtained:  Emergent situation  Universal protocol:     Patient identity confirmed:  Anonymous protocol, patient vented/unresponsive  Pre-procedure details:     Indications: airway protection and cardio/pulmonary arrest      Patient status:  Unresponsive    Mallampati score:  II    Pharmacologic strategy: none      Induction agents:  None    Paralytics:  None  Procedure details:     CPR in progress: yes      Number of attempts:  1  Successful intubation attempt details:     Intubation method:  Oral    Intubation technique: video assisted      Laryngoscope blade:  Mac 3    Tube size (mm):  7.5    Tube type:  Cuffed    Tube visualized through cords: yes    Placement assessment:     ETT at teeth/gumline (cm):  23    Tube secured with:  ETT adkins    Breath sounds:  Equal    Placement verification: chest rise, colorimetric ETCO2, CXR verification and direct visualization      CXR findings:  Appropriate position  Post-procedure details:     Procedure completion:  Tolerated

## 2024-03-16 NOTE — ED TRIAGE NOTES
Patient came in by EMS for a witnessed Cardiac Arrest. Patients wife witnessed patient go down, and called EMS. Patient was down 5 minutes prior to arrival of EMS . Patient was in PEA entire time EMS was doing CPR. Patient down 35 prior to arrival to ED.

## 2024-03-16 NOTE — PROGRESS NOTES
6:42 AM : Pt care transferred to me from Dr. Myles  ,ED provider. History of patient complaint(s), available diagnostic reports and current treatment plan has been discussed thoroughly.   Bedside rounding on patient occured : Yes .  Intended disposition of patient : ADMIT  Pending diagnostics reports and/or labs (please list):     Took over care for this patient from Dr. Josias Segura.  Patient presents with PEA arrest from home.  Given epinephrine, converted to ventricular fibrillation, defibrillation performed x 2, amiodarone 300 mg bolus followed by 150 mg bolus given.  Shortly after right came into the room, patient lost his pulse again.  Was noted to be in ventricular fibrillation thus 200 J unsynchronized defibrillation was administered as well as 1 mg epinephrine.  After 1 round of CPR, ROSC was obtained.  Administered 1 amp of sodium bicarb for presumed severe acidosis.  Patient later lost his pulse again, this time appeared to be PEA arrest.  Given 1 mg of epinephrine.  After 2 rounds of CPR, was obtained.    EPOCH showed venous pH of 6.8, pCO2 of 106 suggesting mixed respiratory and metabolic acidosis.  Will initiate sodium bicarb infusion as well as adjust settings on the mechanical ventilator to increase ventilation.    ED Course as of 03/16/24 1945   Sat Mar 16, 2024   0659 Repeat ECG showed accelerated junctional rhythm with frequent PVCs, no STEMI pattern, narrow QRS duration, prolonged QTc interval [JM]   0700 Added 2 g of IV magnesium sulfate for prolonged QTc interval in setting of cardiac arrest, recent amiodarone boluses given [JM]   0754 Patient became hypotensive with maps in the 40s.  Administered 5 mcg of 0.1 mg/mL epinephrine bolus.  Awaiting epinephrine infusion [JM]   0838 Patient became hypotensive to maps in the 40s again despite starting the epinephrine infusion.  Recommended increasing to 30 micgs per minute with max of 200 mics per minute.  Will add albumin bolus given concern for

## 2024-03-16 NOTE — PROGRESS NOTES
Wife electing for DNR.  CODE STATUS changed      Christelle De Los Santos PA-C  03/16/24  Pulmonary, Critical Care Medicine  [facility]

## 2024-03-16 NOTE — PROCEDURES
Central Line    Date/Time: 3/16/2024 6:55 AM    Performed by: Gabriele Myles MD  Authorized by: Gabriele Myles MD    Consent:     Consent obtained:  Emergent situation  Universal protocol:     Patient identity confirmed:  Anonymous protocol, patient vented/unresponsive  Pre-procedure details:     Indication(s): central venous access and hemodynamic monitoring      Skin preparation:  Chlorhexidine  Sedation:     Sedation type:  None  Anesthesia:     Anesthesia method:  None  Procedure details:     Location:  R femoral    Patient position:  Supine    Procedural supplies:  Triple lumen    Number of attempts:  2    Successful placement: yes    Post-procedure details:     Post-procedure:  Dressing applied    Assessment:  Blood return through all ports    Procedure completion:  Tolerated well, no immediate complications

## 2024-03-16 NOTE — PROCEDURES
[facility]  Arterial  Line Procedure Note with Ultrasound    Indication: Cardiac arrest and shock    Done emergently during cardiac arrest    Line Bundle:  Done emergently without sterile process due to emergent nature.    Under ultrasound guidance  RIGHT/LEFT: leftfemoral artery cannulated x 2 attempt(s) utilizing the modified Seldinger technique.    Good blood return.  Catheter secured & Biopatch applied.  Sterile Tegaderm placed.        Due to emergent nature of procedure and technical limitations, US imaging was unable to be uploaded into EMR.        Homar Stevens MD/MPH     Pulmonary, Critical Care Medicine  [facility]

## 2024-03-16 NOTE — ED NOTES
1st round of CPR  0543- Pulse check            No pulse/PEA            Resume compressions  0544- epi given  0545- pulse check             No pulse/PEA            Resume compressions  0546-epi given  0547-pulse check           No pulse/ PEA          Resume compressions  0548-epi given  0549- Pulse check            V-Fib/shock given            Resume compressions  0550- Epi given  0551- Pulse check            V-Fib/ Shock given  0552- Amiodarone given             300 mg  0553-Pulse check           V-fib/ shock given           Resume compressions  0554- Amiodarone given            150 mg  0555- Pulse check            ROSC achieved

## 2024-03-16 NOTE — PROGRESS NOTES
Advance Care Planning   Healthcare Decision Maker:    Primary Decision Maker: JAGJIT Cardenas - Spouse - [phone]    Secondary Decision Maker: Melissa Dunne - Child - [phone]    Click here to complete Healthcare Decision Makers including selection of the Healthcare Decision Maker Relationship (ie \"Primary\").  Today we documented Decision Maker(s) consistent with Legal Next of Kin hierarchy.        responded to Code for  Duane Cardenas, who is a 84 y.o.,male,     The  provided the following Interventions:  Provided crisis pastoral care and pastoral support to Melissa Cardenas (spouse)/[phone] and Melissa Dunne (daughter)/[phone].   Offered prayers on behalf of the patient.   Chart reviewed. Patient may have an Advance Medical Directive filed at [facility]. Family is not sure but will secure a copy.    The following outcomes were achieved:  Patient was successfully resuscitated but remains critical.    Assessment:  There are no spiritual or Islam issues which require intervention at this time.     Plan:  Chaplains will continue to follow and will provide pastoral care on an as needed/requested basis.   recommends bedside caregivers page  on duty if patient shows signs of acute spiritual or emotional distress.       jennifer Montiel, [facility]  Spiritual Care   [phone]

## 2024-03-16 NOTE — ED NOTES
2nd round of CPR  0609- Compressions started  0610- Epi given  0611- Pulse check            V-Fib/shock given            Resume compressions  0613-Pulse check           ROSC achieved  0616- Amp of sodium bicarb given

## 2024-03-16 NOTE — PROGRESS NOTES
visited with the family of Duane Cardenas, who is a 84 y.o.,male.     The  provided the following Interventions:  Initiated a relationship of care and support.   Offered prayer and assurance of continued prayers on patient's behalf.     Plan:  Chaplains will continue to follow and will provide pastoral care on an as needed/requested basis.   recommends bedside caregivers page  on duty if patient shows signs of acute spiritual or emotional distress.    Patient in very critical condition. Patient received sacrament of the sick.  provided support and offered prayer.      Leonardo [place]  Staff   Spiritual Health   [phone]

## 2024-03-16 NOTE — H&P
[facility]  Pulmonary, Critical Care, and Sleep Medicine    Name: Duane Cardenas MRN: [id number]   : 1940 Hospital: [facility]   Date: 3/16/2024        Critical Care History and Physical      IMPRESSION:   Acute on chronic Hypoxic, hypercapnic respiratory failure requiring mechanical ventilation  Cardiac arrest with prolonged downtime- multiple, total down time around 68-minute , PEA/V-fib arrest  Cardiogenic shock  Anoxic encephalopathy  Severe sepsis with leukocytosis and lactic acidosis  HAGMA secondary to lactic acidosis and acute kidney injury   BRISEIDA  Foci of air near liver and gallbladder, unclear etiology, cholelithiasis, possible concern for cholecystitis  History of asbestosis with chronic interstitial lung disease/chronic fibrosis on home LTOT  CAD, s/p remote CABG  BÁRBARA on CPAP  History of tobacco abuse  DM 2  HLD, HTN     Patient Active Problem List   Diagnosis    Anemia    Phimosis    BPH (benign prostatic hyperplasia)    Chronic coronary artery disease    Decreased urine stream    Urinary urgency    Bilateral hearing loss    ED (erectile dysfunction)    Ventricular fibrillation (HCC)    Shock (HCC)    Cardiac arrest (HCC)        RECOMMENDATIONS:   Neuro: Targeted temperature 96 °F, CT head read normal however does appear to have poor gray-white matter differentiation, he is not requiring any sedation.   Pulm: Titrate FiO2 for goal SPO2> 90%,VAP prevention bundle, head of the bed at 30' all times.Daily sedation holiday and assessment for weaning with SBT as tolerated.    PRN duonebs.  Aspiration precautions, Keep HOB >30 degrees  CVS :Actively titrate vasopressors aim MAP >65mmHg, currently on epinephrine norepinephrine and vasopressin drips  Required amnio bolus, and amnio drip.  Did receive multiple defibrillations and lidocaine during ACLS.  GI: SUP, NPO  Renal:  Trend Renal indices, Strict Is/Os, Hernandez in place  Hem/Onc: Monitor for s/o active bleeding.

## 2024-03-16 NOTE — ED NOTES
3rd round of CPR  0629- CPR started  0630- Epi given  0631- Pulse check            PEA            Resume compressions  0632- Epi given  0633- pulse check            Rosc achieved

## 2024-03-17 PROBLEM — E87.21 ACUTE METABOLIC ACIDOSIS: Status: ACTIVE | Noted: 2024-03-17

## 2024-03-17 PROBLEM — I21.4 NSTEMI (NON-ST ELEVATED MYOCARDIAL INFARCTION) (HCC): Status: ACTIVE | Noted: 2024-03-17

## 2024-03-17 PROBLEM — E87.20 LACTIC ACIDOSIS: Status: ACTIVE | Noted: 2024-03-17

## 2024-03-17 PROBLEM — I46.9 PEA (PULSELESS ELECTRICAL ACTIVITY) (HCC): Status: ACTIVE | Noted: 2024-03-17

## 2024-03-17 PROBLEM — J96.22 ACUTE ON CHRONIC RESPIRATORY FAILURE WITH HYPOXIA AND HYPERCAPNIA (HCC): Status: ACTIVE | Noted: 2024-03-17

## 2024-03-17 PROBLEM — R00.1 BRADYCARDIA: Status: ACTIVE | Noted: 2024-03-17

## 2024-03-17 PROBLEM — G93.1 ANOXIC ENCEPHALOPATHY (HCC): Status: ACTIVE | Noted: 2024-03-17

## 2024-03-17 PROBLEM — R57.0 CARDIOGENIC SHOCK (HCC): Status: ACTIVE | Noted: 2024-03-17

## 2024-03-17 PROBLEM — J69.0 ASPIRATION PNEUMONIA OF BOTH LOWER LOBES DUE TO GASTRIC SECRETIONS (HCC): Status: ACTIVE | Noted: 2024-03-17

## 2024-03-17 PROBLEM — J96.21 ACUTE ON CHRONIC RESPIRATORY FAILURE WITH HYPOXIA AND HYPERCAPNIA (HCC): Status: ACTIVE | Noted: 2024-03-17

## 2024-03-17 LAB
BACTERIA SPEC CULT: NORMAL
SERVICE CMNT-IMP: NORMAL

## 2024-03-17 NOTE — PROGRESS NOTES
PCCM Update:  Discussion with family and wife at bedside.I have explained prognosis is grave and neurological function has declined. They understand at this point he is on several medications to assist with maintaining his BP and HR and patient still continues to decline. He is on max support at this time and remains hypotensive borderline bradycardic. He has no cough, gag, withdrawal to pain, pupils are fixed. At this time, wife has decided to proceed with comfort measures. All family in agreement. Will proceed with placing orders, page RT for extubation, and page  for support.     Xiomara Galicia, AGACNP-BC  03/16/24  Pulmonary, Critical Care Medicine  [facility]

## 2024-03-17 NOTE — DISCHARGE SUMMARY
Death Discharge Summary    Patient: Duane Cardenas               Sex: male          DOA: 3/16/2024         YOB: 1940      Age:  84 y.o.        LOS:  LOS: 0 days                Admit Date: 3/16/2024    Discharge Date: 3/16/2024    Admission Diagnoses: see below   Final Discharge Diagnoses:    Acute on chronic Hypoxic, hypercapnic respiratory failure requiring mechanical ventilation  Cardiac arrest with prolonged downtime- multiple, total down time around 68-minute , PEA/V-fib arrest  Cardiogenic shock  Anoxic encephalopathy  Severe sepsis with leukocytosis and lactic acidosis  HAGMA secondary to lactic acidosis and acute kidney injury   BRISEIDA  Foci of air near liver and gallbladder, unclear etiology, cholelithiasis, possible concern for cholecystitis  History of asbestosis with chronic interstitial lung disease/chronic fibrosis on home LTOT  CAD, s/p remote CABG  BÁRBARA on CPAP  History of tobacco abuse  DM 2  HLD, HTN    Hospital Course:  Patient is a 84 y.o. male with extensive past medical history of chronic lung disease on LTOT, BÁRBARA with CPAP, CAD status post CABG, tobacco abuse who presented to the ED on 3/16 from home via EMS after cardiac arrest.  Patient was down for at least 5 minutes prior to EMS arrival, witnessed cardiac arrest by wife.  It took around 40 minutes to achieve ROSC.  Patient was transferred to [facility] where he proceeded to have 4 more cardiac arrest.  History obtained from chart and wife.  Wife reports that patient has been of normal health the last few days.  Was \" frustrated\" that his portable oxygen tank had not arrived yet from his oxygen company.  States he uses LTOT but is unaware of how many liters.  Does sleep with a CPAP mask at night.  Cardiac arrest while while he was in the bathroom.  Part of the prolonged downtime was inability for of EMS to be able to open the door.  CT head read normal however did appear to have poor gray-white matter differentiation, he is not

## 2024-03-17 NOTE — PROGRESS NOTES
[facility] Update:   Death Pronouncement Note    Patient lying in bed, mouth open, eyes closed.  Pupils fixed and equal bilaterally.  No response to verbal or painful stimuli.  No heart or lung sounds auscultated.  No carotid or peripheral pulses.    Death officially pronounced at 2106, 3/16/2024    Medical Examiner notified: No, does not meet criteria    Family Notified: YES      Xiomara Galicia AGACNP-BC  03/16/24  Pulmonary, Critical Care Medicine  [facility]

## 2024-03-19 LAB
BACTERIA SPEC CULT: NORMAL
GRAM STN SPEC: NORMAL
SERVICE CMNT-IMP: NORMAL

## 2024-03-22 LAB
BACTERIA SPEC CULT: NORMAL
BACTERIA SPEC CULT: NORMAL
SERVICE CMNT-IMP: NORMAL
SERVICE CMNT-IMP: NORMAL